# Patient Record
Sex: MALE | Race: WHITE | NOT HISPANIC OR LATINO | Employment: UNEMPLOYED | URBAN - METROPOLITAN AREA
[De-identification: names, ages, dates, MRNs, and addresses within clinical notes are randomized per-mention and may not be internally consistent; named-entity substitution may affect disease eponyms.]

---

## 2019-07-21 ENCOUNTER — HOSPITAL ENCOUNTER (EMERGENCY)
Facility: HOSPITAL | Age: 24
Discharge: HOME | End: 2019-07-21
Attending: EMERGENCY MEDICINE | Admitting: EMERGENCY MEDICINE

## 2019-07-21 ENCOUNTER — APPOINTMENT (EMERGENCY)
Dept: RADIOLOGY | Facility: HOSPITAL | Age: 24
End: 2019-07-21

## 2019-07-21 VITALS
TEMPERATURE: 98.4 F | HEART RATE: 71 BPM | RESPIRATION RATE: 18 BRPM | SYSTOLIC BLOOD PRESSURE: 124 MMHG | OXYGEN SATURATION: 98 % | DIASTOLIC BLOOD PRESSURE: 70 MMHG | WEIGHT: 276.24 LBS

## 2019-07-21 DIAGNOSIS — J98.01 BRONCHOSPASM: ICD-10-CM

## 2019-07-21 DIAGNOSIS — R06.02 SOB (SHORTNESS OF BREATH): ICD-10-CM

## 2019-07-21 DIAGNOSIS — R05.9 COUGH: Primary | ICD-10-CM

## 2019-07-21 PROCEDURE — 71046 X-RAY EXAM CHEST 2 VIEWS: CPT

## 2019-07-21 PROCEDURE — 99284 EMERGENCY DEPT VISIT MOD MDM: CPT | Performed by: EMERGENCY MEDICINE

## 2019-07-21 PROCEDURE — 94640 AIRWAY INHALATION TREATMENT: CPT

## 2019-07-21 PROCEDURE — 99284 EMERGENCY DEPT VISIT MOD MDM: CPT

## 2019-07-21 RX ORDER — PREDNISONE 20 MG/1
40 TABLET ORAL DAILY
Qty: 8 TABLET | Refills: 0 | Status: SHIPPED | OUTPATIENT
Start: 2019-07-21 | End: 2019-07-25

## 2019-07-21 RX ORDER — ALBUTEROL SULFATE 90 UG/1
1-2 AEROSOL, METERED RESPIRATORY (INHALATION) EVERY 6 HOURS PRN
Qty: 1 INHALER | Refills: 0 | Status: SHIPPED | OUTPATIENT
Start: 2019-07-21

## 2019-07-21 RX ORDER — IPRATROPIUM BROMIDE AND ALBUTEROL SULFATE 2.5; .5 MG/3ML; MG/3ML
3 SOLUTION RESPIRATORY (INHALATION) ONCE
Status: COMPLETED | OUTPATIENT
Start: 2019-07-21 | End: 2019-07-21

## 2019-07-21 RX ORDER — PREDNISONE 20 MG/1
40 TABLET ORAL ONCE
Status: COMPLETED | OUTPATIENT
Start: 2019-07-21 | End: 2019-07-21

## 2019-07-21 RX ORDER — GUAIFENESIN 100 MG/5ML
200 SYRUP ORAL 3 TIMES DAILY PRN
Qty: 90 ML | Refills: 0 | Status: SHIPPED | OUTPATIENT
Start: 2019-07-21 | End: 2019-07-24

## 2019-07-21 RX ADMIN — PREDNISONE 40 MG: 20 TABLET ORAL at 11:43

## 2019-07-21 RX ADMIN — IPRATROPIUM BROMIDE AND ALBUTEROL SULFATE 3 ML: 2.5; .5 SOLUTION RESPIRATORY (INHALATION) at 11:42

## 2019-07-21 NOTE — ED PROVIDER NOTES
History  Chief Complaint   Patient presents with    Shortness of Breath     "I got a cough and trouble breathing during the night, oh also I got poison ivy "     Patient is a healthy 53-year-old male coming in today with shortness of breath  Patient states that started last night into this morning  He has no fevers, chills, chest pain, syncope or palpitations  He has been eating and drinking well with good urine output  He has no recent travel, sick contacts, hemoptysis  States that he has been coughing up yellow-green sputum  No other sick contacts  He states that when he was coughing he could catch his breath this morning  This subsequently resolved  He has no family history of clotting disorders  History provided by:  Patient   used: No    Cough   Cough characteristics:  Productive  Sputum characteristics:  Yellow and green  Severity:  Mild  Onset quality:  Gradual  Timing:  Intermittent  Progression:  Waxing and waning  Chronicity:  New  Smoker: yes    Context: not animal exposure, not exposure to allergens, not fumes, not occupational exposure, not sick contacts, not smoke exposure, not upper respiratory infection, not weather changes and not with activity    Relieved by:  None tried  Worsened by:  Nothing  Ineffective treatments:  None tried  Associated symptoms: shortness of breath and sinus congestion    Associated symptoms: no chest pain, no chills, no diaphoresis, no ear fullness, no ear pain, no eye discharge, no fever, no headaches, no myalgias, no rash, no rhinorrhea, no sore throat, no weight loss and no wheezing    Risk factors: no chemical exposure, no recent infection and no recent travel        None       History reviewed  No pertinent past medical history  History reviewed  No pertinent surgical history  History reviewed  No pertinent family history  I have reviewed and agree with the history as documented      Social History     Tobacco Use    Smoking status: Current Every Day Smoker    Smokeless tobacco: Never Used   Substance Use Topics    Alcohol use: Not Currently    Drug use: Yes     Types: Marijuana        Review of Systems   Constitutional: Negative for chills, diaphoresis, fever and weight loss  HENT: Negative for ear pain, rhinorrhea and sore throat  Eyes: Negative for discharge and visual disturbance  Respiratory: Positive for cough and shortness of breath  Negative for chest tightness and wheezing  Cardiovascular: Negative for chest pain and palpitations  Gastrointestinal: Negative for abdominal pain, nausea and vomiting  Genitourinary: Negative for difficulty urinating and dysuria  Musculoskeletal: Negative for back pain, myalgias and neck pain  Skin: Negative for rash  Neurological: Negative for weakness and headaches  Psychiatric/Behavioral: Negative for confusion  All other systems reviewed and are negative  Physical Exam  Physical Exam   Constitutional: He is oriented to person, place, and time  He appears well-developed and well-nourished  No distress  He is not intubated  HENT:   Head: Normocephalic and atraumatic  Mouth/Throat: Oropharynx is clear and moist    Patient maintaining airway maintaining secretions   Eyes: Pupils are equal, round, and reactive to light  Conjunctivae and EOM are normal    Neck: Normal range of motion  Neck supple  Cardiovascular: Normal rate, regular rhythm, normal heart sounds and intact distal pulses  No murmur heard  Pulses:       Radial pulses are 2+ on the right side, and 2+ on the left side  Dorsalis pedis pulses are 2+ on the right side, and 2+ on the left side  Pulmonary/Chest: Effort normal and breath sounds normal  No stridor  No tachypnea and no bradypnea  He is not intubated  No respiratory distress  Bronchospastic cough on exam     Abdominal: Soft  Bowel sounds are normal  He exhibits no distension  There is no tenderness     Musculoskeletal: Normal range of motion  He exhibits no edema  Neurological: He is alert and oriented to person, place, and time  No cranial nerve deficit  GCS eye subscore is 4  GCS verbal subscore is 5  GCS motor subscore is 6  No ataxia  No slurred speech  No facial droop   Skin: Skin is warm  Capillary refill takes less than 2 seconds  He is not diaphoretic  Nursing note and vitals reviewed  Vital Signs  ED Triage Vitals [07/21/19 1103]   Temperature Pulse Respirations Blood Pressure SpO2   98 4 °F (36 9 °C) 71 18 124/70 98 %      Temp Source Heart Rate Source Patient Position - Orthostatic VS BP Location FiO2 (%)   Tympanic Monitor Sitting Left arm --      Pain Score       --           Vitals:    07/21/19 1103   BP: 124/70   Pulse: 71   Patient Position - Orthostatic VS: Sitting         Visual Acuity      ED Medications  Medications   ipratropium-albuterol (DUO-NEB) 0 5-2 5 mg/3 mL inhalation solution 3 mL (has no administration in time range)   predniSONE tablet 40 mg (has no administration in time range)       Diagnostic Studies  Results Reviewed     None                 XR chest 2 views    (Results Pending)              Procedures  Procedures       ED Course  ED Course as of Jul 21 1304   Sun Jul 21, 2019   1111 Patient is a 20year old male coming in with shortness of breath and cough  On exam patient is nontoxic appearing in no distress  Will obtain chest x-ray as well as give DuoNeb and prednisone for symptomatic control  Differential diagnosis includes but not limited to:  COPD exacerbation, asthma exacerbation, pneumonia, PE, pulmonary edema, pulmonary effusions, lung mass/malignancy, CHF, ACS, NSTEMI, acute kidney injury, electrolyte dysfunction, inhalation injury, anemia, valvular disorder, viral etiology,      Portions of the record may have been created with voice recognition software   Occasional wrong word or "sound a like" substitutions may have occurred due to the inherent limitations of voice recognition software  Read the chart carefully and recognize, using context, where substitutions have occurred  1150 Chest x-ray reveals no acute pneumothorax, focal consolidation  Will reassess and discharged home with prednisone, MDI as well as follow up with PCP  Also discussed with patient to start over-the-counter allergy medicine      1155 Patient finishing up DuoNeb treatment  He is well-appearing  He is no distress  He has no cough on exam   Long discussion with him need for follow-up with PCP as well as to use prednisone as well as MDI  Patient states he did have a rash  There is no noted vesicular lesions  He does have noted very small several superficial excoriations on his right elbow only  There is no petechiae purpura                      PERC Rule for PE      Most Recent Value   PERC Rule for PE   Age >=50  0 Filed at: 07/21/2019 1115   HR >=100  0 Filed at: 07/21/2019 1115   O2 Sat on room air < 95%  0 Filed at: 07/21/2019 1115   History of PE or DVT  0 Filed at: 07/21/2019 1115   Recent trauma or surgery  0 Filed at: 07/21/2019 1115   Hemoptysis  0 Filed at: 07/21/2019 1115   Exogenous estrogen  0 Filed at: 07/21/2019 1115   Unilateral leg swelling  0 Filed at: 07/21/2019 1115   PERC Rule for PE Results  0 Filed at: 07/21/2019 1115                Wells' Criteria for PE      Most Recent Value   Wells' Criteria for PE   Clinical signs and symptoms of DVT  0 Filed at: 07/21/2019 1115   PE is primary diagnosis or equally likely  0 Filed at: 07/21/2019 1115   HR >100  0 Filed at: 07/21/2019 1115   Immobilization at least 3 days or Surgery in the previous 4 weeks  0 Filed at: 07/21/2019 1115   Previous, objectively diagnosed PE or DVT  0 Filed at: 07/21/2019 1115   Hemoptysis  0 Filed at: 07/21/2019 1115   Malignancy with treatment within 6 months or palliative  0 Filed at: 07/21/2019 1115   Wells' Criteria Total  0 Filed at: 07/21/2019 1115            MDM    Disposition  Final diagnoses:   None ED Disposition     None      Follow-up Information    None         Patient's Medications    No medications on file     No discharge procedures on file      ED Provider  Electronically Signed by           Timmy Gordon DO  07/21/19 7838

## 2019-07-21 NOTE — DISCHARGE INSTRUCTIONS
BUY OVER-THE-COUNTER MEDICATIONS SUCH AS CLARITIN OR ZYRTEC TO HELP WITH ALLERGY SYMPTOMS  MAKE SURE YOU FIND A FAMILY DOCTOR TO FOLLOW UP WITH